# Patient Record
Sex: MALE | Race: BLACK OR AFRICAN AMERICAN | Employment: UNEMPLOYED | URBAN - METROPOLITAN AREA
[De-identification: names, ages, dates, MRNs, and addresses within clinical notes are randomized per-mention and may not be internally consistent; named-entity substitution may affect disease eponyms.]

---
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
## 2021-08-04 ENCOUNTER — DOCUMENTATION ONLY (OUTPATIENT)
Dept: PSYCHIATRY | Facility: CLINIC | Age: 21
End: 2021-08-04

## 2021-08-04 NOTE — PROGRESS NOTES
This patient was formerly seen by Dr. Lelo Thomas in the Psychiatry Clinic.  As patient has not been seen in our clinic for one year, we attempted to reach pt and sent a letter on 6/2/21 recommending an adult provider, list below.      [facility] ([place])   [phone]    [facility] ([place])   [phone]    [facility] ([place])   [phone]    [facility] ([place])   [phone]    [facility] ([facility] only)   [phone]      SEFERINO Ervin, [facility]  Child and Adolescent   Psychiatry Clinic and [facility]  [phone]